# Patient Record
Sex: MALE | Race: WHITE | Employment: UNEMPLOYED | URBAN - METROPOLITAN AREA
[De-identification: names, ages, dates, MRNs, and addresses within clinical notes are randomized per-mention and may not be internally consistent; named-entity substitution may affect disease eponyms.]

---
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
## 2025-01-29 ENCOUNTER — HOSPITAL ENCOUNTER (EMERGENCY)
Facility: CLINIC | Age: 20
Discharge: HOME | End: 2025-01-29
Attending: EMERGENCY MEDICINE | Admitting: EMERGENCY MEDICINE
Payer: COMMERCIAL

## 2025-01-29 VITALS
TEMPERATURE: 98.4 F | RESPIRATION RATE: 18 BRPM | DIASTOLIC BLOOD PRESSURE: 92 MMHG | SYSTOLIC BLOOD PRESSURE: 164 MMHG | OXYGEN SATURATION: 97 % | BODY MASS INDEX: 40.83 KG/M2 | WEIGHT: 260.14 LBS | HEIGHT: 67 IN | HEART RATE: 106 BPM

## 2025-01-29 DIAGNOSIS — J21.0 RSV BRONCHIOLITIS: ICD-10-CM

## 2025-01-29 LAB
FLUAV RNA SPEC QL NAA+PROBE: NEGATIVE
FLUBV RNA RESP QL NAA+PROBE: NEGATIVE
RSV RNA SPEC NAA+PROBE: POSITIVE
SARS-COV-2 RNA RESP QL NAA+PROBE: NEGATIVE

## 2025-01-29 PROCEDURE — 87637 SARSCOV2&INF A&B&RSV AMP PRB: CPT | Performed by: EMERGENCY MEDICINE

## 2025-01-29 PROCEDURE — 250N000013 HC RX MED GY IP 250 OP 250 PS 637: Performed by: EMERGENCY MEDICINE

## 2025-01-29 PROCEDURE — 99283 EMERGENCY DEPT VISIT LOW MDM: CPT

## 2025-01-29 RX ORDER — ALBUTEROL SULFATE 90 UG/1
2 INHALANT RESPIRATORY (INHALATION) EVERY 6 HOURS PRN
Qty: 18 G | Refills: 0 | Status: SHIPPED | OUTPATIENT
Start: 2025-01-29

## 2025-01-29 RX ORDER — ALBUTEROL SULFATE 90 UG/1
2 INHALANT RESPIRATORY (INHALATION) ONCE
Status: COMPLETED | OUTPATIENT
Start: 2025-01-29 | End: 2025-01-29

## 2025-01-29 RX ADMIN — ALBUTEROL SULFATE 2 PUFF: 90 AEROSOL, METERED RESPIRATORY (INHALATION) at 08:16

## 2025-01-29 ASSESSMENT — ACTIVITIES OF DAILY LIVING (ADL): ADLS_ACUITY_SCORE: 41

## 2025-01-29 ASSESSMENT — COLUMBIA-SUICIDE SEVERITY RATING SCALE - C-SSRS
1. IN THE PAST MONTH, HAVE YOU WISHED YOU WERE DEAD OR WISHED YOU COULD GO TO SLEEP AND NOT WAKE UP?: NO
6. HAVE YOU EVER DONE ANYTHING, STARTED TO DO ANYTHING, OR PREPARED TO DO ANYTHING TO END YOUR LIFE?: NO
2. HAVE YOU ACTUALLY HAD ANY THOUGHTS OF KILLING YOURSELF IN THE PAST MONTH?: NO

## 2025-01-29 NOTE — DISCHARGE INSTRUCTIONS
Follow-up with your physician in 1 week.    Return to the ER for any worsening shortness of breath or other concerns.

## 2025-01-29 NOTE — Clinical Note
Chaparrogen Cardenas was seen and treated in our emergency department on 1/29/2025.  He may return to work on [unfilled].  [unfilled]     If you have any questions or concerns, please don't hesitate to call.      [unfilled]

## 2025-01-29 NOTE — ED TRIAGE NOTES
Patient states he was recently helping care for his nephew and someone in the family tested positive for RSV so he would like to be tested. Patient reports he has been ill with cold symptoms including cough, runny nose, and feeling generally unwell.       Triage Assessment (Adult)       Row Name 01/29/25 0720          Triage Assessment    Airway WDL WDL        Respiratory WDL    Respiratory WDL WDL        Skin Circulation/Temperature WDL    Skin Circulation/Temperature WDL WDL        Cardiac WDL    Cardiac WDL WDL        Peripheral/Neurovascular WDL    Peripheral Neurovascular WDL WDL        Cognitive/Neuro/Behavioral WDL    Cognitive/Neuro/Behavioral WDL WDL

## 2025-01-29 NOTE — LETTER
January 29, 2025      To Whom It May Concern:      Clinton Cardenas was seen in our Emergency Department today, 01/29/25.  I expect his condition to improve over the next few days.  He may return to work/school when improved.    Sincerely,        Marietta Contreras RN  Electronically signed

## 2025-01-29 NOTE — ED PROVIDER NOTES
"  Emergency Department Note      History of Present Illness     Chief Complaint   Cough      HPI   Clinton Cardenas is a 19 year old male who presents with his brother for cough and congestion.  The patient's symptoms have been present for approximately 1 week.  He feels that he has improved in the last couple days.  Yesterday he had an episode of vomiting but has not been able to eat and drink since then.  He denies chest pain.  No fever.  He has been exposed to 2 ill children recently with RSV.  He is otherwise healthy.    Past Medical History     Medical History and Problem List   Past Medical History:   Diagnosis Date    ADHD (attention deficit hyperactivity disorder)        Medications   albuterol (PROAIR HFA/PROVENTIL HFA/VENTOLIN HFA) 108 (90 Base) MCG/ACT inhaler  cloNIDine (CATAPRES) 0.2 MG tablet  guanFACINE (TENEX) 1 MG tablet  guanFACINE (TENEX) 1 MG tablet        Surgical History   No past surgical history on file.    Physical Exam     Patient Vitals for the past 24 hrs:   BP Temp Temp src Pulse Resp SpO2 Height Weight   01/29/25 0721 (!) 164/92 98.4  F (36.9  C) Oral 106 18 97 % 1.702 m (5' 7\") 118 kg (260 lb 2.3 oz)     Physical Exam  Constitutional:       General: He is not in acute distress.     Appearance: Normal appearance. He is not toxic-appearing.   HENT:      Head: Atraumatic.      Right Ear: Tympanic membrane, ear canal and external ear normal.      Left Ear: Tympanic membrane, ear canal and external ear normal.      Mouth/Throat:      Mouth: Mucous membranes are moist.      Pharynx: Oropharynx is clear. No oropharyngeal exudate or posterior oropharyngeal erythema.   Eyes:      General: No scleral icterus.     Conjunctiva/sclera: Conjunctivae normal.   Cardiovascular:      Rate and Rhythm: Normal rate and regular rhythm.      Heart sounds: Normal heart sounds.   Pulmonary:      Effort: Pulmonary effort is normal. No respiratory distress.      Breath sounds: Normal breath sounds.   Abdominal: "      Palpations: Abdomen is soft.      Tenderness: There is no abdominal tenderness.   Musculoskeletal:         General: No deformity.      Cervical back: Neck supple.   Skin:     General: Skin is warm.   Neurological:      General: No focal deficit present.      Mental Status: He is alert and oriented to person, place, and time.   Psychiatric:         Mood and Affect: Mood normal.         Behavior: Behavior normal.           Diagnostics     Lab Results   Labs Ordered and Resulted from Time of ED Arrival to Time of ED Departure   INFLUENZA A/B, RSV AND SARS-COV2 PCR - Abnormal       Result Value    Influenza A PCR Negative      Influenza B PCR Negative      RSV PCR Positive (*)     SARS CoV2 PCR Negative         ED Course      Medications Administered   Medications   albuterol (PROVENTIL HFA/VENTOLIN HFA) inhaler (2 puffs Inhalation $Given 1/29/25 1333)       Medical Decision Making / Diagnosis     NOEL Cardenas is a 19 year old male who presents to the ED for evaluation of cough and congestion over the last week.  He is positive for RSV but is overall well-appearing.  No hypoxia or evidence of dehydration.  Appropriate for ongoing outpatient supportive cares.    Disposition   The patient was discharged.     Diagnosis     ICD-10-CM    1. RSV bronchiolitis  J21.0            Discharge Medications   Discharge Medication List as of 1/29/2025  8:30 AM        START taking these medications    Details   albuterol (PROAIR HFA/PROVENTIL HFA/VENTOLIN HFA) 108 (90 Base) MCG/ACT inhaler Inhale 2 puffs into the lungs every 6 hours as needed for shortness of breath, wheezing or cough., Disp-18 g, R-0, E-PrescribePharmacy may dispense brand covered by insurance (Proair, or proventil or ventolin or generic albuterol inhaler)                     Rubin Gomez MD  01/29/25 0627